# Patient Record
Sex: FEMALE | Race: WHITE | Employment: UNEMPLOYED | URBAN - METROPOLITAN AREA
[De-identification: names, ages, dates, MRNs, and addresses within clinical notes are randomized per-mention and may not be internally consistent; named-entity substitution may affect disease eponyms.]

---

## 2019-01-17 ENCOUNTER — ROUTINE PRENATAL (OUTPATIENT)
Dept: OBGYN CLINIC | Age: 24
End: 2019-01-17

## 2019-01-17 VITALS
WEIGHT: 152 LBS | DIASTOLIC BLOOD PRESSURE: 81 MMHG | BODY MASS INDEX: 25.33 KG/M2 | HEART RATE: 101 BPM | HEIGHT: 65 IN | RESPIRATION RATE: 18 BRPM | SYSTOLIC BLOOD PRESSURE: 129 MMHG

## 2019-01-17 DIAGNOSIS — Z3A.34 34 WEEKS GESTATION OF PREGNANCY: ICD-10-CM

## 2019-01-17 DIAGNOSIS — Z34.03 ENCOUNTER FOR SUPERVISION OF NORMAL FIRST PREGNANCY IN THIRD TRIMESTER: Primary | ICD-10-CM

## 2019-01-17 PROBLEM — R82.71 GROUP B STREPTOCOCCAL BACTERIURIA: Status: ACTIVE | Noted: 2019-01-17

## 2019-01-17 LAB
ANTIBODY SCREEN, EXTERNAL: NEGATIVE
ANTIBODY SCREEN, EXTERNAL: NEGATIVE
CHLAMYDIA, EXTERNAL: NEGATIVE
HBSAG, EXTERNAL: NEGATIVE
HBSAG, EXTERNAL: NON REACTIVE
HIV, EXTERNAL: NON REACTIVE
N. GONORRHEA, EXTERNAL: NEGATIVE
PAP SMEAR, EXTERNAL: NEGATIVE
RPR, EXTERNAL: NON REACTIVE
RUBELLA, EXTERNAL: NORMAL
TYPE, ABO & RH, EXTERNAL: NORMAL
URINALYSIS, EXTERNAL: NORMAL

## 2019-01-17 NOTE — PROGRESS NOTES
Patient presents [place] prenatal [place]. She has no complaints today. Review of her records shows no pregnancy complications other than a GBS UTI, which has been added to her problem list. She is aware of the need for antibiotics during labor. She is allergic to PCN and no sensitivity testing was done for the urine culture. First and third trimester packets given and all questions answered concerning our practice and L&D protocols. Follow up 2 weeks on rotation. Plan of care discussed. Patient expressed understanding.

## 2019-01-31 ENCOUNTER — ROUTINE PRENATAL (OUTPATIENT)
Dept: OBGYN CLINIC | Age: 24
End: 2019-01-31

## 2019-01-31 ENCOUNTER — HOSPITAL ENCOUNTER (OUTPATIENT)
Dept: LAB | Age: 24
Discharge: HOME | End: 2019-01-31
Payer: OTHER GOVERNMENT

## 2019-01-31 VITALS
TEMPERATURE: 96.7 F | DIASTOLIC BLOOD PRESSURE: 76 MMHG | SYSTOLIC BLOOD PRESSURE: 116 MMHG | HEART RATE: 111 BPM | WEIGHT: 155 LBS | HEIGHT: 65 IN | RESPIRATION RATE: 19 BRPM | BODY MASS INDEX: 25.83 KG/M2

## 2019-01-31 DIAGNOSIS — Z3A.36 36 WEEKS GESTATION OF PREGNANCY: ICD-10-CM

## 2019-01-31 DIAGNOSIS — Z34.03 SUPERVISION OF NORMAL FIRST PREGNANCY IN THIRD TRIMESTER: ICD-10-CM

## 2019-01-31 DIAGNOSIS — Z3A.36 36 WEEKS GESTATION OF PREGNANCY: Primary | ICD-10-CM

## 2019-01-31 LAB
BILIRUB UR QL STRIP: NEGATIVE
GLUCOSE UR-MCNC: NEGATIVE MG/DL
KETONES P FAST UR STRIP-MCNC: NEGATIVE MG/DL
PH UR STRIP: 6 [PH] (ref 4.6–8)
PROT UR QL STRIP: NEGATIVE
SP GR UR STRIP: 1.03 (ref 1–1.03)
UA UROBILINOGEN AMB POC: NORMAL (ref 0.2–1)
URINALYSIS CLARITY POC: CLEAR
URINALYSIS COLOR POC: YELLOW
URINE BLOOD POC: NEGATIVE
URINE LEUKOCYTES POC: NEGATIVE
URINE NITRITES POC: NEGATIVE

## 2019-01-31 PROCEDURE — 87491 CHLMYD TRACH DNA AMP PROBE: CPT

## 2019-01-31 NOTE — PROGRESS NOTES
Ms. Javad Kay is a   36w5d with Estimated Date of Delivery: 19 presents to the office for a routine  prenatal visit. She denies contractions, leakage of fluid, or vaginal bleeding. She reports normal fetal movement. Visit Vitals  /76   Pulse (!) 111   Temp 96.7 °F (35.9 °C) (Oral)   Resp 19   Ht 5' 5\" (1.651 m)   Wt 155 lb (70.3 kg)   LMP 2018   BMI 25.79 kg/m²     SVE: /-3    A/P  36w5d IUP, normal prenatal visit. 1. Continue routine prenatal care  2. Strict 3rd trimester precautions given. Advised regarding leakage of fluid, contractions, and vaginal bleeding. Fetal kick count instructions given. 3. F/U in 1 weeks  4. Cultures today; GBS bacteriuria so no swab collected today.

## 2019-02-01 LAB
C TRACH RRNA SPEC QL NAA+PROBE: NEGATIVE
N GONORRHOEA RRNA SPEC QL NAA+PROBE: NEGATIVE
SPECIMEN SOURCE: NORMAL
T VAGINALIS RRNA SPEC QL NAA+PROBE: NEGATIVE

## 2019-02-08 ENCOUNTER — ROUTINE PRENATAL (OUTPATIENT)
Dept: OBGYN CLINIC | Age: 24
End: 2019-02-08

## 2019-02-08 VITALS
RESPIRATION RATE: 18 BRPM | HEART RATE: 84 BPM | HEIGHT: 65 IN | DIASTOLIC BLOOD PRESSURE: 82 MMHG | OXYGEN SATURATION: 100 % | BODY MASS INDEX: 25.83 KG/M2 | SYSTOLIC BLOOD PRESSURE: 132 MMHG | WEIGHT: 155 LBS

## 2019-02-08 DIAGNOSIS — Z3A.37 37 WEEKS GESTATION OF PREGNANCY: Primary | ICD-10-CM

## 2019-02-08 NOTE — PATIENT INSTRUCTIONS

## 2019-02-08 NOTE — PROGRESS NOTES
37w5d. No OB issues. No OB issues. Recent NARCISO from [address] for [employer]. Labor precautions reviewed. RTO 1 week.

## 2019-02-15 ENCOUNTER — ROUTINE PRENATAL (OUTPATIENT)
Dept: OBGYN CLINIC | Age: 24
End: 2019-02-15

## 2019-02-15 VITALS
TEMPERATURE: 97.8 F | RESPIRATION RATE: 18 BRPM | SYSTOLIC BLOOD PRESSURE: 125 MMHG | HEART RATE: 102 BPM | WEIGHT: 158 LBS | DIASTOLIC BLOOD PRESSURE: 81 MMHG | BODY MASS INDEX: 26.33 KG/M2 | HEIGHT: 65 IN

## 2019-02-15 DIAGNOSIS — Z34.03 SUPERVISION OF NORMAL FIRST PREGNANCY IN THIRD TRIMESTER: ICD-10-CM

## 2019-02-15 DIAGNOSIS — Z3A.38 38 WEEKS GESTATION OF PREGNANCY: Primary | ICD-10-CM

## 2019-02-15 LAB
BILIRUB UR QL STRIP: NEGATIVE
GLUCOSE UR-MCNC: NEGATIVE MG/DL
KETONES P FAST UR STRIP-MCNC: NEGATIVE MG/DL
PH UR STRIP: 6.5 [PH] (ref 4.6–8)
PROT UR QL STRIP: NEGATIVE
SP GR UR STRIP: 1.01 (ref 1–1.03)
UA UROBILINOGEN AMB POC: NORMAL (ref 0.2–1)
URINALYSIS CLARITY POC: CLEAR
URINALYSIS COLOR POC: YELLOW
URINE BLOOD POC: NEGATIVE
URINE LEUKOCYTES POC: NEGATIVE
URINE NITRITES POC: NEGATIVE

## 2019-02-15 NOTE — PROGRESS NOTES
Ms. Smith Louis is a   38w6d with Estimated Date of Delivery: 19 presents to the office for a routine  prenatal visit. She denies contractions, leakage of fluid, or vaginal bleeding. She reports normal fetal movement. Visit Vitals  /81   Pulse (!) 102   Temp 97.8 °F (36.6 °C)   Resp 18   Ht 5' 5\" (1.651 m)   Wt 158 lb (71.7 kg)   LMP 2018   BMI 26.29 kg/m²     SVE: /-2    A/P  38w6d IUP, normal prenatal visit. 1. Continue routine prenatal care  2. Strict 3rd trimester precautions given. Advised regarding leakage of fluid, contractions, and vaginal bleeding. Fetal kick count instructions given.   3. F/U in 1 weeks

## 2019-02-17 ENCOUNTER — ANESTHESIA (OUTPATIENT)
Dept: LABOR AND DELIVERY | Age: 24
End: 2019-02-17
Payer: OTHER GOVERNMENT

## 2019-02-17 ENCOUNTER — ANESTHESIA EVENT (OUTPATIENT)
Dept: LABOR AND DELIVERY | Age: 24
End: 2019-02-17
Payer: OTHER GOVERNMENT

## 2019-02-17 ENCOUNTER — HOSPITAL ENCOUNTER (INPATIENT)
Age: 24
LOS: 3 days | Discharge: HOME | End: 2019-02-20
Attending: OBSTETRICS & GYNECOLOGY | Admitting: OBSTETRICS & GYNECOLOGY
Payer: OTHER GOVERNMENT

## 2019-02-17 DIAGNOSIS — Z3A.39 39 WEEKS GESTATION OF PREGNANCY: ICD-10-CM

## 2019-02-17 PROBLEM — Z34.90 PREGNANCY: Status: ACTIVE | Noted: 2019-02-17

## 2019-02-17 PROBLEM — O42.90 PROM (PREMATURE RUPTURE OF MEMBRANES): Status: ACTIVE | Noted: 2019-02-17

## 2019-02-17 LAB
A1 MICROGLOB PLACENTAL VAG QL: POSITIVE
ABO + RH BLD: NORMAL
BASOPHILS # BLD: 0 K/UL (ref 0–0.1)
BASOPHILS NFR BLD: 0 % (ref 0–2)
BLOOD GROUP ANTIBODIES SERPL: NORMAL
CONTROL LINE PRESENT?: NORMAL
DIFFERENTIAL METHOD BLD: ABNORMAL
EOSINOPHIL # BLD: 0 K/UL (ref 0–0.4)
EOSINOPHIL NFR BLD: 0 % (ref 0–5)
ERYTHROCYTE [DISTWIDTH] IN BLOOD BY AUTOMATED COUNT: 14.9 % (ref 11.6–14.5)
EXPIRATION DATE: NORMAL
HCT VFR BLD AUTO: 33.5 % (ref 35–45)
HGB BLD-MCNC: 10.6 G/DL (ref 12–16)
INTERNAL NEGATIVE CONTROL: NORMAL
KIT LOT NO.: NORMAL
LYMPHOCYTES # BLD: 1 K/UL (ref 0.9–3.6)
LYMPHOCYTES NFR BLD: 11 % (ref 21–52)
MCH RBC QN AUTO: 25.1 PG (ref 24–34)
MCHC RBC AUTO-ENTMCNC: 31.6 G/DL (ref 31–37)
MCV RBC AUTO: 79.4 FL (ref 74–97)
MONOCYTES # BLD: 0.5 K/UL (ref 0.05–1.2)
MONOCYTES NFR BLD: 5 % (ref 3–10)
NEUTS SEG # BLD: 7.9 K/UL (ref 1.8–8)
NEUTS SEG NFR BLD: 84 % (ref 40–73)
PLATELET # BLD AUTO: 144 K/UL (ref 135–420)
PMV BLD AUTO: 12.7 FL (ref 9.2–11.8)
RBC # BLD AUTO: 4.22 M/UL (ref 4.2–5.3)
SPECIMEN EXP DATE BLD: NORMAL
WBC # BLD AUTO: 9.4 K/UL (ref 4.6–13.2)

## 2019-02-17 PROCEDURE — 86900 BLOOD TYPING SEROLOGIC ABO: CPT

## 2019-02-17 PROCEDURE — 75410000002 HC LABOR FEE PER 1 HR

## 2019-02-17 PROCEDURE — 77030034849

## 2019-02-17 PROCEDURE — 74011000250 HC RX REV CODE- 250: Performed by: OBSTETRICS & GYNECOLOGY

## 2019-02-17 PROCEDURE — 84112 EVAL AMNIOTIC FLUID PROTEIN: CPT | Performed by: OBSTETRICS & GYNECOLOGY

## 2019-02-17 PROCEDURE — 65270000029 HC RM PRIVATE

## 2019-02-17 PROCEDURE — 85025 COMPLETE CBC W/AUTO DIFF WBC: CPT

## 2019-02-17 PROCEDURE — 74011000258 HC RX REV CODE- 258: Performed by: OBSTETRICS & GYNECOLOGY

## 2019-02-17 PROCEDURE — 74011250636 HC RX REV CODE- 250/636: Performed by: OBSTETRICS & GYNECOLOGY

## 2019-02-17 RX ORDER — MAG HYDROX/ALUMINUM HYD/SIMETH 200-200-20
15 SUSPENSION, ORAL (FINAL DOSE FORM) ORAL
Status: DISCONTINUED | OUTPATIENT
Start: 2019-02-17 | End: 2019-02-18

## 2019-02-17 RX ORDER — LIDOCAINE HYDROCHLORIDE 10 MG/ML
30 INJECTION, SOLUTION EPIDURAL; INFILTRATION; INTRACAUDAL; PERINEURAL AS NEEDED
Status: DISCONTINUED | OUTPATIENT
Start: 2019-02-17 | End: 2019-02-18

## 2019-02-17 RX ORDER — HYDROMORPHONE HYDROCHLORIDE 1 MG/ML
1 INJECTION, SOLUTION INTRAMUSCULAR; INTRAVENOUS; SUBCUTANEOUS
Status: DISCONTINUED | OUTPATIENT
Start: 2019-02-17 | End: 2019-02-18

## 2019-02-17 RX ORDER — OXYTOCIN/RINGER'S LACTATE 20/1000 ML
125 PLASTIC BAG, INJECTION (ML) INTRAVENOUS AS NEEDED
Status: DISCONTINUED | OUTPATIENT
Start: 2019-02-17 | End: 2019-02-18

## 2019-02-17 RX ORDER — SODIUM CHLORIDE, SODIUM LACTATE, POTASSIUM CHLORIDE, CALCIUM CHLORIDE 600; 310; 30; 20 MG/100ML; MG/100ML; MG/100ML; MG/100ML
125 INJECTION, SOLUTION INTRAVENOUS CONTINUOUS
Status: DISCONTINUED | OUTPATIENT
Start: 2019-02-17 | End: 2019-02-20 | Stop reason: HOSPADM

## 2019-02-17 RX ORDER — CARBOPROST TROMETHAMINE 250 UG/ML
250 INJECTION, SOLUTION INTRAMUSCULAR
Status: DISCONTINUED | OUTPATIENT
Start: 2019-02-17 | End: 2019-02-18

## 2019-02-17 RX ORDER — ONDANSETRON 2 MG/ML
4 INJECTION INTRAMUSCULAR; INTRAVENOUS
Status: DISCONTINUED | OUTPATIENT
Start: 2019-02-17 | End: 2019-02-18

## 2019-02-17 RX ORDER — OXYTOCIN 10 [USP'U]/ML
10 INJECTION, SOLUTION INTRAMUSCULAR; INTRAVENOUS
Status: DISCONTINUED | OUTPATIENT
Start: 2019-02-17 | End: 2019-02-18

## 2019-02-17 RX ORDER — OXYTOCIN/RINGER'S LACTATE 20/1000 ML
999 PLASTIC BAG, INJECTION (ML) INTRAVENOUS AS NEEDED
Status: DISCONTINUED | OUTPATIENT
Start: 2019-02-17 | End: 2019-02-18

## 2019-02-17 RX ORDER — TERBUTALINE SULFATE 1 MG/ML
0.25 INJECTION SUBCUTANEOUS
Status: DISCONTINUED | OUTPATIENT
Start: 2019-02-17 | End: 2019-02-18

## 2019-02-17 RX ORDER — MISOPROSTOL 200 UG/1
800 TABLET ORAL
Status: DISCONTINUED | OUTPATIENT
Start: 2019-02-17 | End: 2019-02-18

## 2019-02-17 RX ORDER — OXYTOCIN/0.9 % SODIUM CHLORIDE 30/500 ML
0-25 PLASTIC BAG, INJECTION (ML) INTRAVENOUS
Status: DISCONTINUED | OUTPATIENT
Start: 2019-02-17 | End: 2019-02-18

## 2019-02-17 RX ORDER — METHYLERGONOVINE MALEATE 0.2 MG/ML
0.2 INJECTION INTRAVENOUS AS NEEDED
Status: DISCONTINUED | OUTPATIENT
Start: 2019-02-17 | End: 2019-02-18

## 2019-02-17 RX ORDER — NALBUPHINE HYDROCHLORIDE 10 MG/ML
10 INJECTION, SOLUTION INTRAMUSCULAR; INTRAVENOUS; SUBCUTANEOUS
Status: DISCONTINUED | OUTPATIENT
Start: 2019-02-17 | End: 2019-02-18

## 2019-02-17 RX ADMIN — SODIUM CHLORIDE, SODIUM LACTATE, POTASSIUM CHLORIDE, AND CALCIUM CHLORIDE 125 ML/HR: 600; 310; 30; 20 INJECTION, SOLUTION INTRAVENOUS at 17:30

## 2019-02-17 RX ADMIN — OXYTOCIN-SODIUM CHLORIDE 0.9% IV SOLN 30 UNIT/500ML 1 MILLI-UNITS/MIN: 30-0.9/5 SOLUTION at 20:00

## 2019-02-17 RX ADMIN — SODIUM CHLORIDE 900 MG: 900 INJECTION, SOLUTION INTRAVENOUS at 18:00

## 2019-02-17 NOTE — H&P
History & Physical 
 
Name: Helga Titus MRN: [id number]  SSN: [id number] YOB: 1995  Age: 21 y.o. Sex: female Subjective:  
 
Estimated Date of Delivery: 19 OB History  Para Term  AB Living 1 0       0 SAB TAB Ectopic Molar Multiple Live Births Ms. Erica Jamil is admitted with pregnancy at 39w1d for Presumptive ROM . Prenatal course was normal.Prenatal care has been followed by Charito Arizmendi after she had a NARCISO 2 month old for [employer] assignment. Please see prenatal records for details. She states she had spontaneous rupture of membranes at 11 am. She denies any regular contractions. When she presented to L&D, she was confirmed SROM and was /-3 posterior. External records reviewed:  Noted GBS in urine with presumed Penicillin allergy. She was told as a child not to take it. History reviewed. No pertinent past medical history. History reviewed. No pertinent surgical history. Social History Occupational History  Not on file Tobacco Use  Smoking status: Never Smoker  Smokeless tobacco: Never Used Substance and Sexual Activity  Alcohol use: No  
  Frequency: Never  Drug use: No  
 Sexual activity: Yes  
  Partners: Male Birth control/protection: None History reviewed. No pertinent family history. Allergies Allergen Reactions  Pcn [Penicillins] Unproven on Challenge Prior to Admission medications Medication Sig Start Date End Date Taking? Authorizing Provider PNV66-Iron Fumarate-FA-DSS-DHA 26-1.2- mg cap Take  by mouth. Yes Provider, Historical  
Iron BisGl &PS Imr-F-P63-FA-Ca 255-96-89-6 mg-mg-mcg-mg cap Take  by mouth. Provider, Historical  
AMBULATORY BREAST PUMP Use as directed. 19   Aiden Albarran DO Review of Systems: A comprehensive review of systems was negative except for that written in the HPI. Objective:  
 
Vitals: Vitals:  
 02/17/19 1532 02/17/19 1544 02/17/19 1545 02/17/19 1559 BP: 139/80 139/78 133/83 124/75 Pulse: 91 86 89 92 Resp: 16 Temp: 97.8 °F (36.6 °C) Physical Exam: 
Heart: Regular rate and rhythm Lung: clear to auscultation throughout lung fields, no wheezes, no rales, no rhonchi and normal respiratory effort Fundus: soft and non tender Perineum: blood absent, amniotic fluid present Cervical Exam: 2 cm dilated 50% effaced   
-3 station  Per RN 
Presenting Part: cephalic Lower Extremities:  - Edema No 
Membranes:  Spontaneous Rupture of Membranes; Amniotic Fluid: clear fluid Fetal Heart Rate: Reactive Prenatal Labs:  
Lab Results Component Value Date/Time  
 Rubella, External Immune 01/17/2019 HBsAg, External Non reactive 01/17/2019 HBsAg, External negative 01/17/2019 HIV, External non reactive 01/17/2019 RPR, External Non reactive 01/17/2019 Gonorrhea, External negative 01/17/2019 Chlamydia, External negative 01/17/2019 Assessment/Plan:  
 
Plan: Admit for Reassuring fetal status, Labor  Progressing normally, Continue plan for vaginal delivery. Group B Strep was positive, will treat prophylactically with clindamycin. Will augment with pitocin if needed. The plan of care has been discussed with the patient. She has been given the opportunity to ask questions, which seem to have been answered satisfactorily. Signed By:  Monisha Woodson MD   
 February 17, 2019

## 2019-02-17 NOTE — PROGRESS NOTES
wnwf to l&d for c/o SROM for clear fluid at 1100 this am -pt states rare cramping in her back noted- pt up to change clothes - efm and toco applied- dr. Rylie Kahn aware of pt-oriented to room etc-

## 2019-02-17 NOTE — PROGRESS NOTES
1648- Admission assessment completed at this time. SVE and Amnisure done per Dr. Karin Chairez telephone order. Call light in reach. No other needs. 1700- Spoke with Dr. Karin Chairez. Telephone order with readback orders for admission and start GBS Clindamycin 900mg Q8hrs. Aware amnisure is positive. Unable to find paper work on patient being GBS positive. Patient states she is. Dr. Karin Chairez is aware. 65- Patient states she feels contractions getting stronger, but is still coping. She desires no epidural for her labor. Given ice water. 1840- No needs. call light in reach. Dr. Karin Chairez at bedside to discuss plan of care. 1915- Bedside and Verbal shift change report given to Kandice arnold (oncoming nurse) by Usama arnold (offgoing nurse). Report included the following information SBAR, Kardex, Procedure Summary, Intake/Output, MAR and Recent Results.

## 2019-02-18 PROCEDURE — 74011250636 HC RX REV CODE- 250/636: Performed by: NURSE ANESTHETIST, CERTIFIED REGISTERED

## 2019-02-18 PROCEDURE — 65270000029 HC RM PRIVATE

## 2019-02-18 PROCEDURE — 76060000078 HC EPIDURAL ANESTHESIA

## 2019-02-18 PROCEDURE — 0UQMXZZ REPAIR VULVA, EXTERNAL APPROACH: ICD-10-PCS | Performed by: OBSTETRICS & GYNECOLOGY

## 2019-02-18 PROCEDURE — 75410000002 HC LABOR FEE PER 1 HR

## 2019-02-18 PROCEDURE — 74011000258 HC RX REV CODE- 258: Performed by: OBSTETRICS & GYNECOLOGY

## 2019-02-18 PROCEDURE — 74011250636 HC RX REV CODE- 250/636

## 2019-02-18 PROCEDURE — 75410000003 HC RECOV DEL/VAG/CSECN EA 0.5 HR

## 2019-02-18 PROCEDURE — 00HU33Z INSERTION OF INFUSION DEVICE INTO SPINAL CANAL, PERCUTANEOUS APPROACH: ICD-10-PCS | Performed by: NURSE ANESTHETIST, CERTIFIED REGISTERED

## 2019-02-18 PROCEDURE — 74011000250 HC RX REV CODE- 250: Performed by: NURSE ANESTHETIST, CERTIFIED REGISTERED

## 2019-02-18 PROCEDURE — 74011250636 HC RX REV CODE- 250/636: Performed by: OBSTETRICS & GYNECOLOGY

## 2019-02-18 PROCEDURE — 77030007879 HC KT SPN EPDRL TELE -B: Performed by: NURSE ANESTHETIST, CERTIFIED REGISTERED

## 2019-02-18 PROCEDURE — 0KQM0ZZ REPAIR PERINEUM MUSCLE, OPEN APPROACH: ICD-10-PCS | Performed by: OBSTETRICS & GYNECOLOGY

## 2019-02-18 PROCEDURE — 74011250637 HC RX REV CODE- 250/637: Performed by: OBSTETRICS & GYNECOLOGY

## 2019-02-18 PROCEDURE — 74011250637 HC RX REV CODE- 250/637

## 2019-02-18 PROCEDURE — 74011000250 HC RX REV CODE- 250: Performed by: OBSTETRICS & GYNECOLOGY

## 2019-02-18 PROCEDURE — 77010026065 HC OXYGEN MINIMUM MEDICAL AIR

## 2019-02-18 PROCEDURE — 75410000000 HC DELIVERY VAGINAL/SINGLE

## 2019-02-18 RX ORDER — PHENYLEPHRINE HCL IN 0.9% NACL 0.4MG/10ML
80 SYRINGE (ML) INTRAVENOUS AS NEEDED
Status: DISCONTINUED | OUTPATIENT
Start: 2019-02-18 | End: 2019-02-18

## 2019-02-18 RX ORDER — EPHEDRINE SULFATE 50 MG/ML
10 INJECTION, SOLUTION INTRAVENOUS AS NEEDED
Status: DISCONTINUED | OUTPATIENT
Start: 2019-02-18 | End: 2019-02-18

## 2019-02-18 RX ORDER — IBUPROFEN 400 MG/1
800 TABLET ORAL
Status: DISCONTINUED | OUTPATIENT
Start: 2019-02-18 | End: 2019-02-20 | Stop reason: HOSPADM

## 2019-02-18 RX ORDER — MINERAL OIL
OIL (ML) ORAL
Status: COMPLETED
Start: 2019-02-18 | End: 2019-02-18

## 2019-02-18 RX ORDER — ZOLPIDEM TARTRATE 5 MG/1
5 TABLET ORAL
Status: DISCONTINUED | OUTPATIENT
Start: 2019-02-18 | End: 2019-02-20 | Stop reason: HOSPADM

## 2019-02-18 RX ORDER — PROMETHAZINE HYDROCHLORIDE 25 MG/ML
25 INJECTION, SOLUTION INTRAMUSCULAR; INTRAVENOUS
Status: DISCONTINUED | OUTPATIENT
Start: 2019-02-18 | End: 2019-02-20 | Stop reason: HOSPADM

## 2019-02-18 RX ORDER — FENTANYL CITRATE 50 UG/ML
100 INJECTION, SOLUTION INTRAMUSCULAR; INTRAVENOUS ONCE
Status: COMPLETED | OUTPATIENT
Start: 2019-02-18 | End: 2019-02-18

## 2019-02-18 RX ORDER — ACETAMINOPHEN 325 MG/1
650 TABLET ORAL
Status: DISCONTINUED | OUTPATIENT
Start: 2019-02-18 | End: 2019-02-20 | Stop reason: HOSPADM

## 2019-02-18 RX ORDER — ROPIVACAINE HYDROCHLORIDE 2 MG/ML
INJECTION, SOLUTION EPIDURAL; INFILTRATION; PERINEURAL AS NEEDED
Status: DISCONTINUED | OUTPATIENT
Start: 2019-02-18 | End: 2019-02-18 | Stop reason: HOSPADM

## 2019-02-18 RX ORDER — AMOXICILLIN 250 MG
1 CAPSULE ORAL
Status: DISCONTINUED | OUTPATIENT
Start: 2019-02-18 | End: 2019-02-20 | Stop reason: HOSPADM

## 2019-02-18 RX ORDER — OXYCODONE AND ACETAMINOPHEN 5; 325 MG/1; MG/1
2 TABLET ORAL
Status: DISCONTINUED | OUTPATIENT
Start: 2019-02-18 | End: 2019-02-20 | Stop reason: HOSPADM

## 2019-02-18 RX ADMIN — Medication 2500 MILLI-UNITS/HR: at 04:23

## 2019-02-18 RX ADMIN — MINERAL OIL 60 ML: 99.9 LIQUID ORAL; TOPICAL at 04:10

## 2019-02-18 RX ADMIN — SODIUM CHLORIDE 900 MG: 900 INJECTION, SOLUTION INTRAVENOUS at 02:00

## 2019-02-18 RX ADMIN — Medication 10 ML/HR: at 00:29

## 2019-02-18 RX ADMIN — ONDANSETRON 4 MG: 2 INJECTION INTRAMUSCULAR; INTRAVENOUS at 00:54

## 2019-02-18 RX ADMIN — ROPIVACAINE HYDROCHLORIDE 5 ML: 2 INJECTION, SOLUTION EPIDURAL; INFILTRATION; PERINEURAL at 00:32

## 2019-02-18 RX ADMIN — IBUPROFEN 800 MG: 400 TABLET ORAL at 10:16

## 2019-02-18 RX ADMIN — SODIUM CHLORIDE, SODIUM LACTATE, POTASSIUM CHLORIDE, AND CALCIUM CHLORIDE 125 ML/HR: 600; 310; 30; 20 INJECTION, SOLUTION INTRAVENOUS at 00:40

## 2019-02-18 RX ADMIN — FENTANYL CITRATE 100 MCG: 50 INJECTION, SOLUTION INTRAMUSCULAR; INTRAVENOUS at 00:28

## 2019-02-18 RX ADMIN — Medication 1 SPRAY: at 10:18

## 2019-02-18 RX ADMIN — IBUPROFEN 800 MG: 400 TABLET ORAL at 18:19

## 2019-02-18 RX ADMIN — ROPIVACAINE HYDROCHLORIDE 5 ML: 2 INJECTION, SOLUTION EPIDURAL; INFILTRATION; PERINEURAL at 00:30

## 2019-02-18 NOTE — LACTATION NOTE
Mother states baby nursed briefly after delivery 11 hours ago. Mother was sleepy so discussed typical  feeding patterns/expectations in the first 24 hours. Encouraged skin to skin, look for feeding cues and continue to try. Encouraged to ask for assistance when baby is ready to nurse.

## 2019-02-18 NOTE — PROGRESS NOTES
Labor Progress Note Patient seen, fetal heart rate and contraction pattern evaluated, patient examined. She is comfortable with epidural.  She was recently examined and found to be completely dilated per Nursing. No data found. Physical Exam: 
Cervical Exam:  10 cm dilated 100% effaced 0 station Presenting Part: cephalic Membranes:  Spontaneous Rupture of Membranes; Amniotic Fluid: clear fluid Uterine Activity: Frequency: Every 2-3 minutes Fetal Heart Rate: Reactive Assessment/Plan: 
Reassuring fetal status, Labor  Progressing normally, Continue plan for vaginal delivery Anticipate . Will start pushing.

## 2019-02-18 NOTE — ROUTINE PROCESS
TRANSFER - IN REPORT: 
 
Verbal report received from [address] [place] [address], RN(name) on Syl Posey  being received from L&D recovery(unit) for routine progression of care Report consisted of patients Situation, Background, Assessment and  
Recommendations(SBAR). Information from the following report(s) SBAR, Kardex, Intake/Output, MAR and Recent Results was reviewed with the receiving nurse. Opportunity for questions and clarification was provided. Assessment completed upon patients arrival to unit and care assumed. 0840--vital signs stable--denies need for pain medication--right leg remains heavy from epidural--advised to call for assistance when ready to get up to the bathroom again--verbalizes understanding--offered assistance eith breast feeding prn--po fluids encouraged. 1000--assisted out of bed to the bathroom, and voided a large amount x 1--tolerated well without weakness--both legs able to bear weight--per care reviewed--cold pack replaced to perineum--back to bed--no excessive bleeding--may be up on her own now. 1830--vital signs stable--voiding qs--effective pain management with Motrin--breast feeding is a work in progress.

## 2019-02-18 NOTE — PROGRESS NOTES
conducted a Follow-up Visit and Spiritual Assessment  for Anastacia Perdomo, who is a 23 y.o.,female. Patient's  Peter Pelaez and baby boy Brain Degroot were also in the room. They confirmed their Voodoo affiliation as [address]  is in the [employer] Prayer offered for patient, , and  baby. Baby also received a blessing. Patient's chart reviewed. Chaplains will continue to follow and provide pastoral care as needed or requested. The Rev. [address] Spiritual Care Services [address] [facility] [facility] [phone] / [address] [phone]

## 2019-02-18 NOTE — PROGRESS NOTES
Bedside shift change report given to Stacy Owens RN 
 (oncoming nurse) by Joshua Whittington (offgoing nurse). Report included the following information SBAR  
0725  Asumed care of  Pt who is del since 46  With lac repair  icepack on pt denies pain    holding infant at bs pt wants to eat breakfast prior to motrin . 0745  Assisted to bathroom   Pt voided large amount   pericare inst  
0800  Transferred to [facility] via w/c with infant  calllight to pt  Pt inst niot to get out of bed without nurse  
0805  Report to waleska marley rn

## 2019-02-18 NOTE — ANESTHESIA PROCEDURE NOTES
Epidural Block Start time: 2/18/2019 12:13 AM 
End time: 2/18/2019 12:32 AM 
Performed by: Annmarie Diaz CRNA Authorized by: Annmarie Diaz CRNA Pre-Procedure Indication: labor epidural   
Preanesthetic Checklist: patient identified, risks and benefits discussed, anesthesia consent, patient being monitored, timeout performed and anesthesia consent Timeout Time: 00:13 Epidural:  
Patient position:  Seated Prep region:  Lumbar Prep: Betadine and Patient draped Prep comment:  X3 
Location:  L3-4 Needle and Epidural Catheter:  
Needle Type:  Tuohy Needle Gauge:  18 G Injection Technique:  Loss of resistance using saline Attempts:  1 Catheter Size:  20 G Catheter at Skin Depth (cm):  11 Depth in Epidural Space (cm):  6 Events: no blood with aspiration, no cerebrospinal fluid with aspiration, no paresthesia and negative aspiration test   
Test Dose:  Negative Assessment:  
Catheter Secured:  Tegaderm and tape Insertion:  Uncomplicated Patient tolerance:  Patient tolerated the procedure well with no immediate complications Fentanyl 50 + 50 mcg via CIELO at 1229

## 2019-02-18 NOTE — ANESTHESIA POSTPROCEDURE EVALUATION
* No procedures listed *. Anesthesia Post Evaluation Multimodal analgesia: multimodal analgesia used between 6 hours prior to anesthesia start to PACU discharge Patient location during evaluation: floor Patient participation: complete - patient participated Level of consciousness: awake and alert Pain management: adequate Airway patency: patent Anesthetic complications: no 
Cardiovascular status: acceptable Respiratory status: acceptable Hydration status: acceptable Post anesthesia nausea and vomiting:  none Visit Vitals /67 (BP Patient Position: During activity) Pulse 96 Temp 37.4 °C (99.4 °F) Resp 18 Breastfeeding? Unknown

## 2019-02-18 NOTE — ANESTHESIA PREPROCEDURE EVALUATION
Anesthetic History No history of anesthetic complications Review of Systems / Medical History Patient summary reviewed, nursing notes reviewed and pertinent labs reviewed Pulmonary Within defined limits Neuro/Psych Within defined limits Cardiovascular Within defined limits GI/Hepatic/Renal 
Within defined limits Endo/Other Within defined limits Other Findings Physical Exam 
 
Airway Mallampati: II 
TM Distance: 4 - 6 cm Neck ROM: normal range of motion Mouth opening: Normal 
 
 Cardiovascular Regular rate and rhythm,  S1 and S2 normal,  no murmur, click, rub, or gallop Dental 
No notable dental hx Pulmonary Breath sounds clear to auscultation Abdominal 
GI exam deferred Other Findings Anesthetic Plan ASA: 2 Anesthesia type: epidural 
 
 
 
 
 
Anesthetic plan and risks discussed with: Patient FOB

## 2019-02-19 LAB
HCT VFR BLD AUTO: 28.3 % (ref 35–45)
HGB BLD-MCNC: 8.8 G/DL (ref 12–16)

## 2019-02-19 PROCEDURE — 65270000029 HC RM PRIVATE

## 2019-02-19 PROCEDURE — 74011250637 HC RX REV CODE- 250/637: Performed by: OBSTETRICS & GYNECOLOGY

## 2019-02-19 PROCEDURE — 74011250637 HC RX REV CODE- 250/637: Performed by: SPECIALIST

## 2019-02-19 PROCEDURE — 85018 HEMOGLOBIN: CPT

## 2019-02-19 PROCEDURE — 36415 COLL VENOUS BLD VENIPUNCTURE: CPT

## 2019-02-19 PROCEDURE — 85014 HEMATOCRIT: CPT

## 2019-02-19 RX ORDER — LANOLIN ALCOHOL/MO/W.PET/CERES
1 CREAM (GRAM) TOPICAL 2 TIMES DAILY WITH MEALS
Status: DISCONTINUED | OUTPATIENT
Start: 2019-02-19 | End: 2019-02-20 | Stop reason: HOSPADM

## 2019-02-19 RX ADMIN — SENNOSIDES AND DOCUSATE SODIUM 1 TABLET: 8.6; 5 TABLET ORAL at 16:29

## 2019-02-19 RX ADMIN — FERROUS SULFATE TAB 325 MG (65 MG ELEMENTAL FE) 325 MG: 325 (65 FE) TAB at 17:46

## 2019-02-19 NOTE — ROUTINE PROCESS
Verbal shift change report given to Jj King RN (oncoming nurse) by Danielle Hardwick RN (offgoing nurse). Report included the following information Kardex, Intake/Output, MAR and Recent Results. 1025--voiding without difficulty--no weakness when up--showered last night--Dermaplast Spray and Tucks are easing the discomfort of her perineal repair--offered Sitz Bath--patient denies need--denies pain--breast feeding is a work in progress--requested patient to call to observe latch--verbalizes understanding--po fluids encouraged. 1100--baby sucks fairly well on right breast, but dose not latch well on the left 1615--baby has been sleepy post circumcision, but more alert now--not latching well on left breast--breast shield give to assist--patient to sign consent--denies pain 1830--vital signs stable--voiding qs--denies pain--nipple shield given to aid breast feeding.

## 2019-02-19 NOTE — PROGRESS NOTES
1917: This RN to room for bedside SBAR. Pt resting comfortably in bed, family at side. Denies needs or concerns. Discussed POC for this shift, pt verbalizes understanding. 2000: This RN to room, assessment and VS WNL. Pt denies needs or concerns at this time. 2100: Infant returned to room, assisted to breast. Pt denies needs or concerns. 2310: This RN to room, pt requesting more ice packs. Provided by this RN. Denies other needs or concerns at this time. 0007: This RN to room, pt sleeping comfortably. 0225: Pt resting comfortably, denies needs or concerns at this time. 0405: This RN to room, pt resting comfortably in bed with infant in arms. Denies needs or concerns. 7758: This RN to room, pt ambulating in room. Denies needs or concerns at this time. 12: SBAR to Ana Agudelo RN at this time. Care relinquished.

## 2019-02-19 NOTE — PROGRESS NOTES
Progress Note Patient: Peter Rock MRN: [id number] YOB: 1995  Age: 21 y.o. Sex: female Subjective:  
 
Post Del Day: 1 Delivery: spontaneous vaginal 
 
The patient is resting comfortably and feels well. Pain is  well controlled with current medications. Patient is ambulating and voiding without difficulty. The patient is tolerating a regular diet without nausea or vomiting. Objective:  
  
Patient Vitals for the past 12 hrs: 
 BP Temp Pulse Resp SpO2  
02/19/19 0405 109/61 98.7 °F (37.1 °C) 84 16 98 % General:    alert, cooperative, no distress Lochia:  appropriate Psych:  appropriate to circumstances DVT Evaluation:  No evidence of DVT seen on physical exam.  
 
Lab/Data Review: All lab results for the last 24 hours reviewed. Lab Results Component Value Date/Time WBC 9.4 02/17/2019 04:31 PM  
 HGB 8.8 (L) 02/19/2019 06:49 AM  
 HCT 28.3 (L) 02/19/2019 06:49 AM  
 PLATELET 402 05/65/8798 04:31 PM  
 MCV 79.4 02/17/2019 04:31 PM  
 
 
Assessment:  
 
21 y.o. y/o female status post: spontaneous vaginal 
 
Plan: 1. Ambulation encouraged 2. Pain well-controlled 3. Continue routine postpartum care 4. Iron for anemia Signed By: Quinton Benoit MD   
 February 19, 2019

## 2019-02-19 NOTE — ROUTINE PROCESS
Bedside and Verbal shift change report given to Monica Jean RN (oncoming nurse) by Ciara Meraz RN (offgoing nurse). Report included the following information SBAR, Procedure Summary, Intake/Output, MAR and Recent Results.

## 2019-02-19 NOTE — L&D DELIVERY NOTE
Delivery Summary    Patient: Cate Harrell MRN: [id number]  SSN: [id number]    YOB: 1995  Age: 21 y.o. Sex: female       Information for the patient's :  Gilles Mora [[id number]]       Labor Events:    Labor: No   Rupture Date: 2019   Rupture Time: 11:00 AM   Rupture Type SROM   Amniotic Fluid Volume: Moderate    Amniotic Fluid Description: Clear None   Induction: None       Augmentation: Oxytocin   Labor Events: None     Cervical Ripening:     None     Delivery Events:  Episiotomy:     Laceration(s): Second degree perineal Right labial   Repaired: Yes    Number of Repair Packets: 3   Suture Type and Size: Vicryl 3-0  Rapide 4-0     Estimated Blood Loss (ml): 500ml       Delivery Date: 2019    Delivery Time: 4:22 AM  Delivery Type: Vaginal, Spontaneous  Sex:  Male     Gestational Age: 44w2d   Delivery Clinician:  Jenise Ayala  Living Status: Living   Delivery Location: L&D            APGARS  One minute Five minutes Ten minutes   Skin color: 0   1        Heart rate: 2   2        Grimace: 2   2        Muscle tone: 1   2        Breathin   2        Totals: 7   9            Presentation: Vertex    Position: Right Occiput Anterior  Resuscitation Method:  Tactile Stimulation;Suctioning-bulb     Meconium Stained: Other (Comment) Terminal meconium    Cord Information: 3 Vessels  Complications:    Cord around:    Delayed cord clamping?  Yes  Cord clamped date/time:2019  4:45 AM  Disposition of Cord Blood: Lab    Blood Gases Sent?: No    Placenta:  Date/Time: 2019  4:28 AM  Removal: Spontaneous      Appearance: Normal      Measurements:  Birth Weight: 7 lb 14.5 oz (3.585 kg)      Birth Length: 1' 8\" (0.508 m)      Head Circumference: 1' 1.78\" (0.35 m)      Chest Circumference: 1' 1.78\" (0.35 m)     Abdominal Girth: 1' 1.78\" (0.35 m)    Other Providers:   Yady Rashid [facility] A;Daniela Hutchison Obstetrician;Primary Nurse;Primary [facility] B Strep: No results found for: GRBSEXT, GRBSEXT  Information for the patient's :  [employer] Finder [[id number]]   No results found for: ABORH, PCTABR, PCTDIG, BILI, ABORHEXT, ABORH    No results for input(s): PCO2CB, PO2CB, HCO3I, SO2I, IBD, PTEMPI, SPECTI, PHICB, ISITE, IDEV, IALLEN in the last 72 hours.

## 2019-02-20 VITALS
SYSTOLIC BLOOD PRESSURE: 116 MMHG | RESPIRATION RATE: 17 BRPM | DIASTOLIC BLOOD PRESSURE: 76 MMHG | HEART RATE: 66 BPM | OXYGEN SATURATION: 98 % | TEMPERATURE: 98.1 F

## 2019-02-20 PROCEDURE — 74011250637 HC RX REV CODE- 250/637: Performed by: OBSTETRICS & GYNECOLOGY

## 2019-02-20 PROCEDURE — 74011250637 HC RX REV CODE- 250/637: Performed by: SPECIALIST

## 2019-02-20 RX ORDER — LANOLIN ALCOHOL/MO/W.PET/CERES
325 CREAM (GRAM) TOPICAL 2 TIMES DAILY WITH MEALS
Qty: 60 TAB | Refills: 6 | Status: SHIPPED | OUTPATIENT
Start: 2019-02-20 | End: 2020-05-12 | Stop reason: SDUPTHER

## 2019-02-20 RX ORDER — IBUPROFEN 800 MG/1
800 TABLET ORAL
Qty: 30 TAB | Refills: 0 | Status: SHIPPED | OUTPATIENT
Start: 2019-02-20

## 2019-02-20 RX ADMIN — SENNOSIDES AND DOCUSATE SODIUM 1 TABLET: 8.6; 5 TABLET ORAL at 07:49

## 2019-02-20 RX ADMIN — FERROUS SULFATE TAB 325 MG (65 MG ELEMENTAL FE) 325 MG: 325 (65 FE) TAB at 07:49

## 2019-02-20 NOTE — ROUTINE PROCESS
Mother doing well. Up without complaints. Voiding without problems. Pt refused pain medication through out shift, but is aware of its availability when needed. Pt bonding well with baby.

## 2019-02-20 NOTE — ROUTINE PROCESS
Bedside shift change report given to Jessica Mcneill RN (oncoming nurse) by Kishor Ortiz RN (offgoing nurse). Report included the following information SBAR, Kardex, Procedure Summary, Intake/Output, MAR and Recent Results.

## 2019-02-20 NOTE — DISCHARGE SUMMARY
Obstetrical Discharge Summary       [address] [facility]  [address] [address]              Patient ID:  Jose Luis Alcazar  [id number]  89 y.o.  1995    Admit Date: 2019    Discharge Date: 2019     Admitting Physician: Prerna Mcadams MD     Admission Diagnoses: PROM (premature rupture of membranes) [O42.90]; Pregnancy [Z34.90]    Discharge Diagnoses: same as above      Additional Diagnoses: none        Hospital Course: Unremarkable                           Information for the patient's :  [employer] Finder [[id number]]          Immunizations: There is no immunization history for the selected administration types on file for this patient. Group Beta Strep: No results found for: GRBSEXT     Visit Vitals  /76 (BP 1 Location: Right arm, BP Patient Position: At rest)   Pulse 66   Temp 98.1 °F (36.7 °C)   Resp 17   LMP 2018   SpO2 98%   Breastfeeding? Unknown       Vital signs stable, afebrile. Exam:  Patient without distress. Abdomen soft, fundus firm at level of umbilicus, non tender               Perineum with normal lochia noted. Lower extremities are negative for swelling, cords or tenderness. Patient Instructions:   Current Discharge Medication List      START taking these medications    Details   ferrous sulfate 325 mg (65 mg iron) tablet Take 1 Tab by mouth two (2) times daily (with meals). Qty: 60 Tab, Refills: 6      ibuprofen (MOTRIN) 800 mg tablet Take 1 Tab by mouth every eight (8) hours as needed. Qty: 30 Tab, Refills: 0         CONTINUE these medications which have NOT CHANGED    Details   PNV66-Iron Fumarate-FA-DSS-DHA 26-1.2- mg cap Take  by mouth. Iron BisGl &PS Anl-Y-W63-FA-Ca 860-93-36-8 mg-mg-mcg-mg cap Take  by mouth. AMBULATORY BREAST PUMP Use as directed. Qty: 1 Units, Refills: 0    Comments: Double electric breast pump of patient's choice.              See discharge instructions provided by nursing. Follow-up in 6 weeks.     Signed:  Aj Mccarty DO  2/20/2019  9:19 AM

## 2019-02-20 NOTE — PROGRESS NOTES
0700: Bedside and Verbal shift change report given to [address] (oncoming nurse) by Tiny Gupta RN (offgoing nurse). Report included the following information SBAR, Kardex, Intake/Output and Recent Results. 0745: Assessment completed, WNL. 0830: Pediatrician in to see. 0930:  Up to bathroom. 1000: Discharge instructions reviewed with patient. Understanding verbalized of all instructions given. Time given for questions, questions answered. Electronic signature obtained. 1100: Patient brought to front of hospital via wheelchair, discharged in stable condition.

## 2019-02-20 NOTE — LACTATION NOTE
Mother was given a nipple shield and states baby is now nursing well. Reviewed latch, colostrum, feeding frequency, diapers, milk coming in, pumping and nipple care. Discussed shield use, the need to keep close count of diapers and encouraged her to work with baby to eliminate shield. Gave BF information, daily log and resource guide. Offered assistance if needed.

## 2019-04-01 ENCOUNTER — ROUTINE PRENATAL (OUTPATIENT)
Dept: OBGYN CLINIC | Age: 24
End: 2019-04-01

## 2019-04-01 VITALS
BODY MASS INDEX: 22.99 KG/M2 | HEIGHT: 65 IN | HEART RATE: 77 BPM | WEIGHT: 138 LBS | DIASTOLIC BLOOD PRESSURE: 67 MMHG | SYSTOLIC BLOOD PRESSURE: 121 MMHG

## 2019-04-01 NOTE — PROGRESS NOTES
Yann Sin presents today for   Chief Complaint   Patient presents with   [address]       Is someone accompanying this pt? Infant   atient does not have any concerns at this time. Patient does not want  birth control at this time . Depression screening     Depression Scale  In the past 7 days:  I have been able to laugh and see the funny side of things[de-identified] As much as I always could  I have looked forward with enjoyment to things: As much as I ever did  I have blamed myself unnecessarily when things went wrong: No, never  I have been anxious or worried for no good reason: No, not at all  I have felt scared or panicky for no good reason: No, not at all  Things have been getting on top of me: No, I have been coping as well as ever  I have been so unhappy that I have had difficulty sleeping: No, not at all  I have felt sad or miserable: No, not at all  I have been so unhappy that I have been crying: No, never     Is the patient using any DME equipment during OV? Breast pump     Depression Screening:  No flowsheet data found. Learning Assessment:  No flowsheet data found. Abuse Screening:  No flowsheet data found. Fall Risk  No flowsheet data found. Health Maintenance reviewed and discussed and ordered per Provider. Health Maintenance Due   Topic Date Due    HPV Age 9Y-34Y (1 - Female 3-dose series) 2010    PAP AKA CERVICAL CYTOLOGY  2016   .

## 2019-04-01 NOTE — PROGRESS NOTES
Subjective:   Ms. Arpita Erazo is a 21 y.o. who is now 6 weeks postpartum. OB History        1    Para   1    Term   1            AB        Living   1       SAB        TAB        Ectopic        Molar        Multiple   0    Live Births   1              Method of delivery: normal spontaneous vaginal delivery  She is breast-feeding and is not experiencing problems. Pregnancy complications: none. She is feeling well and that she has no suicidal/homicidal inclination. She currently uses abstinence for contraception. She plans to use natural family planning (NFP) for contraception. Patient Active Problem List   Diagnosis Code    Group B streptococcal bacteriuria R82.71    PROM (premature rupture of membranes) O42.90    Pregnancy Z34.90     Current Outpatient Medications   Medication Sig Dispense Refill    ferrous sulfate 325 mg (65 mg iron) tablet Take 1 Tab by mouth two (2) times daily (with meals). 60 Tab 6    AMBULATORY BREAST PUMP Use as directed. 1 Units 0    ibuprofen (MOTRIN) 800 mg tablet Take 1 Tab by mouth every eight (8) hours as needed. 30 Tab 0    PNV66-Iron Fumarate-FA-DSS-DHA 26-1.2- mg cap Take  by mouth.  Iron BisGl &PS Utc-G-S88-FA-Ca 157-50-75-0 mg-mg-mcg-mg cap Take  by mouth. Allergies   Allergen Reactions    Pcn [Penicillins] Unproven on Challenge     No past medical history on file. No past surgical history on file. No family history on file.   Social History     Tobacco Use    Smoking status: Never Smoker    Smokeless tobacco: Never Used   Substance Use Topics    Alcohol use: No     Frequency: Never        Objective:   Physical Exam:  Date of last Pap smear:   Physical Exam: GENERAL APPEARANCE: alert, well appearing, in no apparent distress  EXTERNAL GENITALIA POSTPARTUM: normal, well-healed, without lesions or masses  VAGINA POSTPARTUM: normal, well-healed, physiologic discharge, without lesions  CERVIX POSTPARTUM: normal, well-healed, without lesions  UTERUS POSTPARTUM: normal size, well involuted, firm, non-tender  ADNEXA POSTPARTUM: no masses palpable and nontender    Assessment/Plan:   normal postpartum exam  patient is a candidate for natural family planning (NFP) for contraception, with no contraindications  See orders and Patient Instructions

## 2019-12-17 ENCOUNTER — ROUTINE PRENATAL (OUTPATIENT)
Dept: OBGYN CLINIC | Age: 24
End: 2019-12-17

## 2019-12-17 VITALS
WEIGHT: 147 LBS | RESPIRATION RATE: 19 BRPM | BODY MASS INDEX: 24.49 KG/M2 | SYSTOLIC BLOOD PRESSURE: 127 MMHG | HEIGHT: 65 IN | HEART RATE: 112 BPM | DIASTOLIC BLOOD PRESSURE: 81 MMHG | TEMPERATURE: 97.7 F

## 2019-12-17 DIAGNOSIS — N92.6 MISSED MENSES: Primary | ICD-10-CM

## 2019-12-17 LAB
HCG URINE, QL. (POC): NEGATIVE
VALID INTERNAL CONTROL?: YES

## 2019-12-17 NOTE — PROGRESS NOTES
Missed menses. Pt with recent delivery in April. Plans to have many children. Dating ultrasound done- 9w1d, which is c/w LMP minus 2 days  PNLs next visit. RTO 2 weeks.

## 2020-01-07 ENCOUNTER — HOSPITAL ENCOUNTER (OUTPATIENT)
Dept: LAB | Age: 25
Discharge: HOME | End: 2020-01-07
Payer: OTHER GOVERNMENT

## 2020-01-07 ENCOUNTER — ROUTINE PRENATAL (OUTPATIENT)
Dept: OBGYN CLINIC | Age: 25
End: 2020-01-07

## 2020-01-07 VITALS
TEMPERATURE: 98 F | WEIGHT: 145 LBS | HEART RATE: 97 BPM | RESPIRATION RATE: 18 BRPM | DIASTOLIC BLOOD PRESSURE: 76 MMHG | BODY MASS INDEX: 24.16 KG/M2 | HEIGHT: 65 IN | SYSTOLIC BLOOD PRESSURE: 113 MMHG

## 2020-01-07 DIAGNOSIS — Z3A.12 PREGNANCY WITH 12 COMPLETED WEEKS GESTATION: Primary | ICD-10-CM

## 2020-01-07 DIAGNOSIS — Z34.81 ENCOUNTER FOR SUPERVISION OF OTHER NORMAL PREGNANCY IN FIRST TRIMESTER: ICD-10-CM

## 2020-01-07 DIAGNOSIS — Z3A.12 12 WEEKS GESTATION OF PREGNANCY: ICD-10-CM

## 2020-01-07 DIAGNOSIS — Z3A.12 PREGNANCY WITH 12 COMPLETED WEEKS GESTATION: ICD-10-CM

## 2020-01-07 LAB
ABO + RH BLD: NORMAL
BASOPHILS # BLD: 0 K/UL (ref 0–0.1)
BASOPHILS NFR BLD: 0 % (ref 0–2)
BLOOD GROUP ANTIBODIES SERPL: NORMAL
DIFFERENTIAL METHOD BLD: ABNORMAL
EOSINOPHIL # BLD: 0 K/UL (ref 0–0.4)
EOSINOPHIL NFR BLD: 1 % (ref 0–5)
ERYTHROCYTE [DISTWIDTH] IN BLOOD BY AUTOMATED COUNT: 13.2 % (ref 11.6–14.5)
HCT VFR BLD AUTO: 35.9 % (ref 35–45)
HGB BLD-MCNC: 11.9 G/DL (ref 12–16)
LYMPHOCYTES # BLD: 1.3 K/UL (ref 0.9–3.6)
LYMPHOCYTES NFR BLD: 19 % (ref 21–52)
MCH RBC QN AUTO: 27.7 PG (ref 24–34)
MCHC RBC AUTO-ENTMCNC: 33.1 G/DL (ref 31–37)
MCV RBC AUTO: 83.7 FL (ref 74–97)
MONOCYTES # BLD: 0.4 K/UL (ref 0.05–1.2)
MONOCYTES NFR BLD: 5 % (ref 3–10)
NEUTS SEG # BLD: 5.3 K/UL (ref 1.8–8)
NEUTS SEG NFR BLD: 75 % (ref 40–73)
PLATELET # BLD AUTO: 212 K/UL (ref 135–420)
PMV BLD AUTO: 11.9 FL (ref 9.2–11.8)
RBC # BLD AUTO: 4.29 M/UL (ref 4.2–5.3)
SPECIMEN EXP DATE BLD: NORMAL
WBC # BLD AUTO: 7 K/UL (ref 4.6–13.2)

## 2020-01-07 PROCEDURE — 86762 RUBELLA ANTIBODY: CPT

## 2020-01-07 PROCEDURE — 86780 TREPONEMA PALLIDUM: CPT

## 2020-01-07 PROCEDURE — 83021 HEMOGLOBIN CHROMOTOGRAPHY: CPT

## 2020-01-07 PROCEDURE — 85025 COMPLETE CBC W/AUTO DIFF WBC: CPT

## 2020-01-07 PROCEDURE — 87340 HEPATITIS B SURFACE AG IA: CPT

## 2020-01-07 PROCEDURE — 87389 HIV-1 AG W/HIV-1&-2 AB AG IA: CPT

## 2020-01-07 PROCEDURE — 87086 URINE CULTURE/COLONY COUNT: CPT

## 2020-01-07 PROCEDURE — 36415 COLL VENOUS BLD VENIPUNCTURE: CPT

## 2020-01-07 PROCEDURE — 87661 TRICHOMONAS VAGINALIS AMPLIF: CPT

## 2020-01-07 PROCEDURE — 86900 BLOOD TYPING SEROLOGIC ABO: CPT

## 2020-01-07 NOTE — PROGRESS NOTES
PRENATAL INTAKE SUMMARY    Ms. Daniel Gomes presents today for her first prenatal visit. OB History        2    Para   1    Term   1            AB        Living   1       SAB        TAB        Ectopic        Molar        Multiple   0    Live Births   1              I have reviewed the patient's medical, obstetrical, social, and family histories, medications, and available lab results. Subjective:   She has no unusual complaints    Objective:   Initial Physical Exam (New OB)    GENERAL APPEARANCE: alert, well appearing, in no apparent distress  HEAD: normocephalic, atraumatic  MOUTH: mucous membranes moist, pharynx normal without lesions  THYROID: no thyromegaly or masses present  BREASTS: not examined  LUNGS: clear to auscultation, no wheezes, rales or rhonchi, symmetric air entry  HEART: regular rate and rhythm, no murmurs  ABDOMEN: soft, nontender, nondistended, no abnormal masses, no epigastric pain and FHT present  EXTREMITIES: no redness or tenderness in the calves or thighs, no edema  SKIN: normal coloration and turgor, no rashes  LYMPH NODES: no adenopathy palpable  NEUROLOGIC: alert, oriented, normal speech, no focal findings or movement disorder noted    PELVIC EXAM: EXTERNAL GENITALIA: normal appearing vulva with no masses, tenderness or lesions  VAGINA: no abnormal discharge or lesions  CERVIX: no lesions or cervical motion tenderness  UTERUS: gravid and consistent with 12 weeks  ADNEXA: no masses palpable and nontender  OB EXAM PELVIMETRY: appears adequate    Assessment/Plan:   Normal pregnancy    Routine Prenatal care    ICD-10-CM ICD-9-CM    1.  Pregnancy with 12 completed weeks gestation Z3A.12 V22.2 CULTURE, URINE      HEMOGLOBIN FRACTIONATION      T PALLIDUM AB      RUBELLA AB, IGG      CBC WITH AUTOMATED DIFF      HEP B SURFACE AG      HIV 1/2 AG/AB, 4TH GENERATION,W RFLX CONFIRM      TYPE & SCREEN   2. 12 weeks gestation of pregnancy Z3A.12 V22.2 CHLAMYDIA/NEISSERIA/TRICHOMONAS AMP Chart reviewed- pap done in 2018 and normal.  Pt declines genetic screening. RTO 4 weeks.

## 2020-01-08 LAB
C TRACH RRNA SPEC QL NAA+PROBE: NEGATIVE
HBV SURFACE AG SER QL: 0.14 INDEX
HBV SURFACE AG SER QL: NEGATIVE
HIV 1+2 AB+HIV1 P24 AG SERPL QL IA: NONREACTIVE
HIV12 RESULT COMMENT, HHIVC: NORMAL
N GONORRHOEA RRNA SPEC QL NAA+PROBE: NEGATIVE
RUBV IGG SER-IMP: NORMAL
SPECIMEN SOURCE: NORMAL
T PALLIDUM AB SER QL IA: NONREACTIVE
T VAGINALIS RRNA SPEC QL NAA+PROBE: NEGATIVE

## 2020-01-09 LAB
BACTERIA SPEC CULT: NORMAL
DEPRECATED HGB OTHER BLD-IMP: 0 %
HGB A MFR BLD: 97.7 % (ref 96.4–98.8)
HGB A2 MFR BLD COLUMN CHROM: 2.3 % (ref 1.8–3.2)
HGB C MFR BLD: 0 %
HGB F MFR BLD: 0 % (ref 0–2)
HGB FRACT BLD-IMP: NORMAL
HGB S BLD QL SOLY: NEGATIVE
HGB S MFR BLD: 0 %
SERVICE CMNT-IMP: NORMAL

## 2020-02-04 ENCOUNTER — ROUTINE PRENATAL (OUTPATIENT)
Dept: OBGYN CLINIC | Age: 25
End: 2020-02-04

## 2020-02-04 VITALS
DIASTOLIC BLOOD PRESSURE: 74 MMHG | RESPIRATION RATE: 18 BRPM | SYSTOLIC BLOOD PRESSURE: 121 MMHG | HEART RATE: 91 BPM | TEMPERATURE: 98 F | BODY MASS INDEX: 24.49 KG/M2 | WEIGHT: 147 LBS | HEIGHT: 65 IN

## 2020-02-04 DIAGNOSIS — Z3A.16 16 WEEKS GESTATION OF PREGNANCY: Primary | ICD-10-CM

## 2020-03-03 ENCOUNTER — ROUTINE PRENATAL (OUTPATIENT)
Dept: OBGYN CLINIC | Age: 25
End: 2020-03-03

## 2020-03-03 VITALS
BODY MASS INDEX: 25.16 KG/M2 | SYSTOLIC BLOOD PRESSURE: 114 MMHG | DIASTOLIC BLOOD PRESSURE: 74 MMHG | RESPIRATION RATE: 18 BRPM | HEIGHT: 65 IN | WEIGHT: 151 LBS | HEART RATE: 81 BPM | TEMPERATURE: 98.3 F

## 2020-03-03 NOTE — PATIENT INSTRUCTIONS
Learning About When to Call Your Doctor During Pregnancy (Up to 20 Weeks)  Your Care Instructions    It's common to have concerns about what might be a problem during pregnancy. Although most pregnant women don't have any serious problems, it's important to know when to call your doctor if you have certain symptoms. These are general suggestions. Your doctor may give you some more information about when to call. When to call your doctor (up to 20 weeks)  Call 911 anytime you think you may need emergency care. For example, call if:  · You passed out (lost consciousness). Call your doctor now or seek immediate medical care if:  · You have a fever. · You have vaginal bleeding. · You are dizzy or lightheaded, or you feel like you may faint. · You have symptoms of a urinary tract infection. These may include:  ? Pain or burning when you urinate. ? A frequent need to urinate without being able to pass much urine. ? Pain in the flank, which is just below the rib cage and above the waist on either side of the back. ? Blood in your urine. · You have belly pain. · You think you are having contractions. · You have a sudden release of fluid from your vagina. Watch closely for changes in your health, and be sure to contact your doctor if:  · You have vaginal discharge that smells bad. · You have other concerns about your pregnancy. Follow-up care is a key part of your treatment and safety. Be sure to make and go to all appointments, and call your doctor if you are having problems. It's also a good idea to know your test results and keep a list of the medicines you take. Where can you learn more? Go to http://jackie-mahesh.info/. Enter M518 in the search box to learn more about \"Learning About When to Call Your Doctor During Pregnancy (Up to 20 Weeks). \"  Current as of: May 29, 2019  Content Version: 12.2  © 0855-7586 [employer].  Care instructions adapted under license by [employer] (which disclaims liability or warranty for this information). If you have questions about a medical condition or this instruction, always ask your healthcare professional. [address] any warranty or liability for your use of this information.

## 2020-03-03 NOTE — PROGRESS NOTES
20w4d. No OB issues. Denies LOF/Vb  Morph done today and normal.  Breast pump form given. RTO 4 weeks.

## 2020-03-31 ENCOUNTER — ROUTINE PRENATAL (OUTPATIENT)
Dept: OBGYN CLINIC | Age: 25
End: 2020-03-31

## 2020-03-31 VITALS
WEIGHT: 153 LBS | TEMPERATURE: 98.3 F | BODY MASS INDEX: 25.49 KG/M2 | DIASTOLIC BLOOD PRESSURE: 78 MMHG | HEART RATE: 88 BPM | HEIGHT: 65 IN | SYSTOLIC BLOOD PRESSURE: 123 MMHG | RESPIRATION RATE: 17 BRPM

## 2020-03-31 DIAGNOSIS — Z3A.24 PREGNANCY WITH 24 COMPLETED WEEKS GESTATION: Primary | ICD-10-CM

## 2020-04-28 ENCOUNTER — OFFICE VISIT (OUTPATIENT)
Dept: OBGYN CLINIC | Age: 25
End: 2020-04-28

## 2020-04-28 ENCOUNTER — HOSPITAL ENCOUNTER (OUTPATIENT)
Dept: LAB | Age: 25
Discharge: HOME | End: 2020-04-28
Payer: OTHER GOVERNMENT

## 2020-04-28 ENCOUNTER — ROUTINE PRENATAL (OUTPATIENT)
Dept: OBGYN CLINIC | Age: 25
End: 2020-04-28

## 2020-04-28 VITALS
WEIGHT: 163 LBS | DIASTOLIC BLOOD PRESSURE: 63 MMHG | BODY MASS INDEX: 27.16 KG/M2 | HEART RATE: 107 BPM | SYSTOLIC BLOOD PRESSURE: 98 MMHG | TEMPERATURE: 97.4 F | HEIGHT: 65 IN

## 2020-04-28 DIAGNOSIS — Z3A.28 PREGNANCY WITH 28 COMPLETED WEEKS GESTATION: Primary | ICD-10-CM

## 2020-04-28 DIAGNOSIS — Z3A.28 28 WEEKS GESTATION OF PREGNANCY: Primary | ICD-10-CM

## 2020-04-28 DIAGNOSIS — Z3A.28 28 WEEKS GESTATION OF PREGNANCY: ICD-10-CM

## 2020-04-28 LAB
BASOPHILS # BLD: 0 K/UL (ref 0–0.1)
BASOPHILS NFR BLD: 0 % (ref 0–2)
DIFFERENTIAL METHOD BLD: ABNORMAL
EOSINOPHIL # BLD: 0 K/UL (ref 0–0.4)
EOSINOPHIL NFR BLD: 1 % (ref 0–5)
ERYTHROCYTE [DISTWIDTH] IN BLOOD BY AUTOMATED COUNT: 13.1 % (ref 11.6–14.5)
GLUCOSE 1H P 100 G GLC PO SERPL-MCNC: 96 MG/DL (ref 60–140)
HCT VFR BLD AUTO: 31.7 % (ref 35–45)
HGB BLD-MCNC: 10.1 G/DL (ref 12–16)
LYMPHOCYTES # BLD: 1 K/UL (ref 0.9–3.6)
LYMPHOCYTES NFR BLD: 13 % (ref 21–52)
MCH RBC QN AUTO: 27.1 PG (ref 24–34)
MCHC RBC AUTO-ENTMCNC: 31.9 G/DL (ref 31–37)
MCV RBC AUTO: 85 FL (ref 74–97)
MONOCYTES # BLD: 0.5 K/UL (ref 0.05–1.2)
MONOCYTES NFR BLD: 6 % (ref 3–10)
NEUTS SEG # BLD: 6 K/UL (ref 1.8–8)
NEUTS SEG NFR BLD: 80 % (ref 40–73)
PLATELET # BLD AUTO: 160 K/UL (ref 135–420)
PMV BLD AUTO: 11.3 FL (ref 9.2–11.8)
RBC # BLD AUTO: 3.73 M/UL (ref 4.2–5.3)
WBC # BLD AUTO: 7.5 K/UL (ref 4.6–13.2)

## 2020-04-28 PROCEDURE — 36415 COLL VENOUS BLD VENIPUNCTURE: CPT

## 2020-04-28 PROCEDURE — 85025 COMPLETE CBC W/AUTO DIFF WBC: CPT

## 2020-04-28 PROCEDURE — 82950 GLUCOSE TEST: CPT

## 2020-04-28 NOTE — PROGRESS NOTES
Chief Complaint   Patient presents with    Routine Prenatal Visit     Visit Vitals  BP 98/63   Pulse (!) 107   Temp 97.4 °F (36.3 °C)   Ht 5' 5\" (1.651 m)   Wt 163 lb (73.9 kg)   LMP 10/11/2019 (Exact Date)   BMI 27.12 kg/m²     Results for orders placed or performed during the hospital encounter of 01/07/20   CULTURE, URINE   Result Value Ref Range    Special Requests: NO SPECIAL REQUESTS      Culture result: NO GROWTH 2 DAYS     HEMOGLOBIN FRACTIONATION   Result Value Ref Range    Hgb Solubility NEGATIVE  NEGATIVE      Hemoglobin A 97.7 96.4 - 98.8 %    Hemoglobin S 0.0 0.0 %    Hemoglobin C 0.0 0.0 %    Hemoglobin A2 2.3 1.8 - 3.2 %    Hemoglobin F 0.0 0.0 - 2.0 %    Hemoglobin, Other 0.0 0.0 %    Interpretation Comment     T PALLIDUM AB   Result Value Ref Range    T. pallidum interpretation. NONREACTIVE NR     RUBELLA AB, IGG   Result Value Ref Range    Rubella IgG, QL IMMUNE IMM     CBC WITH AUTOMATED DIFF   Result Value Ref Range    WBC 7.0 4.6 - 13.2 K/uL    RBC 4.29 4. 20 - 5.30 M/uL    HGB 11.9 (L) 12.0 - 16.0 g/dL    HCT 35.9 35.0 - 45.0 %    MCV 83.7 74.0 - 97.0 FL    MCH 27.7 24.0 - 34.0 PG    MCHC 33.1 31.0 - 37.0 g/dL    RDW 13.2 11.6 - 14.5 %    PLATELET 834 769 - 500 K/uL    MPV 11.9 (H) 9.2 - 11.8 FL    NEUTROPHILS 75 (H) 40 - 73 %    LYMPHOCYTES 19 (L) 21 - 52 %    MONOCYTES 5 3 - 10 %    EOSINOPHILS 1 0 - 5 %    BASOPHILS 0 0 - 2 %    ABS. NEUTROPHILS 5.3 1.8 - 8.0 K/UL    ABS. LYMPHOCYTES 1.3 0.9 - 3.6 K/UL    ABS. MONOCYTES 0.4 0.05 - 1.2 K/UL    ABS. EOSINOPHILS 0.0 0.0 - 0.4 K/UL    ABS. BASOPHILS 0.0 0.0 - 0.1 K/UL    DF AUTOMATED     HEP B SURFACE AG   Result Value Ref Range    Hepatitis B surface Ag 0.14 <1.00 Index    Hep B surface Ag Interp.  NEGATIVE  NEG     HIV 1/2 AG/AB, 4TH GENERATION,W RFLX CONFIRM   Result Value Ref Range    HIV 1/2 Interpretation NONREACTIVE NR      HIV 1/2 result comment SEE NOTE     CHLAMYDIA/NEISSERIA/TRICHOMONAS AMP   Result Value Ref Range    Chlamydia amplification NEGATIVE  NEG      N. gonorrhoeae amplification NEGATIVE  NEG      Trichomonas amplification NEGATIVE  NEG      Specimen Source ENDOCERVICAL     TYPE & SCREEN   Result Value Ref Range    Crossmatch Expiration 01/10/2020     ABO/Rh(D) A POSITIVE     Antibody screen NEG

## 2020-05-01 ENCOUNTER — TELEPHONE (OUTPATIENT)
Dept: OBGYN CLINIC | Age: 25
End: 2020-05-01

## 2020-05-01 NOTE — TELEPHONE ENCOUNTER
Poke with this patient concerning Lab work and Rx that has been sent to her Pharmacy , all questions answered patient voiced understanding

## 2020-05-01 NOTE — TELEPHONE ENCOUNTER
----- Message from Aarti Garcia MD sent at 4/28/2020  2:07 PM EDT -----  Normal diabetes screen.   Worsening anemia- needs Iron sulfate 325 mg daily-OTC  Please inform

## 2020-05-12 ENCOUNTER — VIRTUAL VISIT (OUTPATIENT)
Dept: OBGYN CLINIC | Age: 25
End: 2020-05-12

## 2020-05-12 DIAGNOSIS — Z3A.30 PREGNANCY WITH 30 COMPLETED WEEKS GESTATION: Primary | ICD-10-CM

## 2020-05-12 RX ORDER — LANOLIN ALCOHOL/MO/W.PET/CERES
325 CREAM (GRAM) TOPICAL DAILY
Qty: 60 TAB | Refills: 4 | Status: SHIPPED | OUTPATIENT
Start: 2020-05-12

## 2020-05-12 NOTE — PROGRESS NOTES
30w4d. No OB issues. Pt seen via [facility] me  Notes normal FM. Denies LOF/VB  Glucola normal with worsening anemia. Pt requests Rx sent to pharmacy for Iron. Rx sent. RTO 3 weeks.

## 2020-05-12 NOTE — PROGRESS NOTES
Peter Cote presents today for   Chief Complaint   Patient presents with    Routine Prenatal Visit       Is someone accompanying this pt? no  Patient does not have any concerns at this time. Patient no birth control at this time . Depression screening     Is the patient using any DME equipment during OV? no    Depression Screening:  No flowsheet data found. Learning Assessment:  No flowsheet data found. Abuse Screening:  No flowsheet data found. Fall Risk  No flowsheet data found. This Visit Test  Results for orders placed or performed during the hospital encounter of 04/28/20   CBC WITH AUTOMATED DIFF   Result Value Ref Range    WBC 7.5 4.6 - 13.2 K/uL    RBC 3.73 (L) 4.20 - 5.30 M/uL    HGB 10.1 (L) 12.0 - 16.0 g/dL    HCT 31.7 (L) 35.0 - 45.0 %    MCV 85.0 74.0 - 97.0 FL    MCH 27.1 24.0 - 34.0 PG    MCHC 31.9 31.0 - 37.0 g/dL    RDW 13.1 11.6 - 14.5 %    PLATELET 563 878 - 057 K/uL    MPV 11.3 9.2 - 11.8 FL    NEUTROPHILS 80 (H) 40 - 73 %    LYMPHOCYTES 13 (L) 21 - 52 %    MONOCYTES 6 3 - 10 %    EOSINOPHILS 1 0 - 5 %    BASOPHILS 0 0 - 2 %    ABS. NEUTROPHILS 6.0 1.8 - 8.0 K/UL    ABS. LYMPHOCYTES 1.0 0.9 - 3.6 K/UL    ABS. MONOCYTES 0.5 0.05 - 1.2 K/UL    ABS. EOSINOPHILS 0.0 0.0 - 0.4 K/UL    ABS. BASOPHILS 0.0 0.0 - 0.1 K/UL    DF AUTOMATED     GLUCOSE, GESTATIONAL 1 HR TOLERANCE   Result Value Ref Range    GESTATIONAL GLUC 1HR 96 60 - 140 MG/DL       Health Maintenance reviewed and discussed and ordered per Provider. Health Maintenance Due   Topic Date Due    HPV Age 9Y-34Y (1 - 2-dose series) 07/25/2006    OB 3RD TRIMESTER TDAP  04/17/2020   . Coordination of Care:  1. Have you been to the ER, urgent care clinic since your last visit? Hospitalized since your last visit? no    2. Have you seen or consulted any other health care providers outside of the [address] [place] [facility] [place] [address] since your last visit? Include any pap smears or colon screening.  no

## 2020-06-02 ENCOUNTER — ROUTINE PRENATAL (OUTPATIENT)
Dept: OBGYN CLINIC | Age: 25
End: 2020-06-02

## 2020-06-02 VITALS
DIASTOLIC BLOOD PRESSURE: 71 MMHG | SYSTOLIC BLOOD PRESSURE: 121 MMHG | BODY MASS INDEX: 28.16 KG/M2 | RESPIRATION RATE: 18 BRPM | HEART RATE: 107 BPM | WEIGHT: 169 LBS | TEMPERATURE: 96.9 F | HEIGHT: 65 IN

## 2020-06-02 DIAGNOSIS — Z3A.33 PREGNANCY WITH 33 COMPLETED WEEKS GESTATION: Primary | ICD-10-CM

## 2020-06-02 NOTE — PROGRESS NOTES
Chief Complaint   Patient presents with    Routine Prenatal Visit     Visit Vitals  /71   Pulse (!) 107   Temp 96.9 °F (36.1 °C) (Tympanic)   Resp 18   Ht 5' 5\" (1.651 m)   Wt 169 lb (76.7 kg)   LMP 10/11/2019 (Exact Date)   BMI 28.12 kg/m²     Results for orders placed or performed during the hospital encounter of 04/28/20   CBC WITH AUTOMATED DIFF   Result Value Ref Range    WBC 7.5 4.6 - 13.2 K/uL    RBC 3.73 (L) 4.20 - 5.30 M/uL    HGB 10.1 (L) 12.0 - 16.0 g/dL    HCT 31.7 (L) 35.0 - 45.0 %    MCV 85.0 74.0 - 97.0 FL    MCH 27.1 24.0 - 34.0 PG    MCHC 31.9 31.0 - 37.0 g/dL    RDW 13.1 11.6 - 14.5 %    PLATELET 535 195 - 810 K/uL    MPV 11.3 9.2 - 11.8 FL    NEUTROPHILS 80 (H) 40 - 73 %    LYMPHOCYTES 13 (L) 21 - 52 %    MONOCYTES 6 3 - 10 %    EOSINOPHILS 1 0 - 5 %    BASOPHILS 0 0 - 2 %    ABS. NEUTROPHILS 6.0 1.8 - 8.0 K/UL    ABS. LYMPHOCYTES 1.0 0.9 - 3.6 K/UL    ABS. MONOCYTES 0.5 0.05 - 1.2 K/UL    ABS. EOSINOPHILS 0.0 0.0 - 0.4 K/UL    ABS.  BASOPHILS 0.0 0.0 - 0.1 K/UL    DF AUTOMATED     GLUCOSE, GESTATIONAL 1 HR TOLERANCE   Result Value Ref Range    GESTATIONAL GLUC 1HR 96 60 - 140 MG/DL

## 2020-06-17 ENCOUNTER — ROUTINE PRENATAL (OUTPATIENT)
Dept: OBGYN CLINIC | Age: 25
End: 2020-06-17

## 2020-06-17 VITALS
OXYGEN SATURATION: 99 % | HEART RATE: 109 BPM | HEIGHT: 65 IN | RESPIRATION RATE: 20 BRPM | BODY MASS INDEX: 28.46 KG/M2 | DIASTOLIC BLOOD PRESSURE: 76 MMHG | WEIGHT: 170.8 LBS | SYSTOLIC BLOOD PRESSURE: 117 MMHG

## 2020-06-17 DIAGNOSIS — Z3A.35 35 WEEKS GESTATION OF PREGNANCY: Primary | ICD-10-CM

## 2020-06-17 NOTE — PROGRESS NOTES
Birtha Stagers presents today for   Chief Complaint   Patient presents with    Routine Prenatal Visit       Is someone accompanying this pt? no  Patient does not have any concerns at this time. Patient no birth control at this time . Depression screening     Is the patient using any DME equipment during OV? no    Depression Screening:  No flowsheet data found. Learning Assessment:  No flowsheet data found. Abuse Screening:  No flowsheet data found. Fall Risk  No flowsheet data found. This Visit Test  Results for orders placed or performed during the hospital encounter of 04/28/20   CBC WITH AUTOMATED DIFF   Result Value Ref Range    WBC 7.5 4.6 - 13.2 K/uL    RBC 3.73 (L) 4.20 - 5.30 M/uL    HGB 10.1 (L) 12.0 - 16.0 g/dL    HCT 31.7 (L) 35.0 - 45.0 %    MCV 85.0 74.0 - 97.0 FL    MCH 27.1 24.0 - 34.0 PG    MCHC 31.9 31.0 - 37.0 g/dL    RDW 13.1 11.6 - 14.5 %    PLATELET 113 898 - 168 K/uL    MPV 11.3 9.2 - 11.8 FL    NEUTROPHILS 80 (H) 40 - 73 %    LYMPHOCYTES 13 (L) 21 - 52 %    MONOCYTES 6 3 - 10 %    EOSINOPHILS 1 0 - 5 %    BASOPHILS 0 0 - 2 %    ABS. NEUTROPHILS 6.0 1.8 - 8.0 K/UL    ABS. LYMPHOCYTES 1.0 0.9 - 3.6 K/UL    ABS. MONOCYTES 0.5 0.05 - 1.2 K/UL    ABS. EOSINOPHILS 0.0 0.0 - 0.4 K/UL    ABS. BASOPHILS 0.0 0.0 - 0.1 K/UL    DF AUTOMATED     GLUCOSE, GESTATIONAL 1 HR TOLERANCE   Result Value Ref Range    GESTATIONAL GLUC 1HR 96 60 - 140 MG/DL       Health Maintenance reviewed and discussed and ordered per Provider. Health Maintenance Due   Topic Date Due    HPV Age 9Y-34Y (1 - 2-dose series) 07/25/2006    OB 3RD TRIMESTER TDAP  04/17/2020   . Coordination of Care:  1. Have you been to the ER, urgent care clinic since your last visit? Hospitalized since your last visit? no    2. Have you seen or consulted any other health care providers outside of the [address] [place] [address] since your last visit? Include any pap smears or colon screening.  No  Visit Vitals  /76 Pulse (!) 109   Resp 20   Ht 5' 5\" (1.651 m)   Wt 170 lb 12.8 oz (77.5 kg)   LMP 10/11/2019 (Exact Date)   SpO2 99%   BMI 28.42 kg/m²

## 2020-06-17 NOTE — PROGRESS NOTES
Doing well, concerned with some pelvic pain- not regular in nature; +FM  Plan:  1) f/u one week routine ob- gbs/gonorrhea/chlamydia at that time

## 2020-06-17 NOTE — PATIENT INSTRUCTIONS

## 2020-06-24 ENCOUNTER — HOSPITAL ENCOUNTER (OUTPATIENT)
Dept: LAB | Age: 25
Discharge: HOME | End: 2020-06-24
Payer: OTHER GOVERNMENT

## 2020-06-24 ENCOUNTER — ROUTINE PRENATAL (OUTPATIENT)
Dept: OBGYN CLINIC | Age: 25
End: 2020-06-24

## 2020-06-24 VITALS
DIASTOLIC BLOOD PRESSURE: 75 MMHG | SYSTOLIC BLOOD PRESSURE: 130 MMHG | BODY MASS INDEX: 28.82 KG/M2 | HEART RATE: 105 BPM | RESPIRATION RATE: 18 BRPM | HEIGHT: 65 IN | TEMPERATURE: 98.3 F | WEIGHT: 173 LBS

## 2020-06-24 DIAGNOSIS — Z3A.36 36 WEEKS GESTATION OF PREGNANCY: Primary | ICD-10-CM

## 2020-06-24 DIAGNOSIS — Z3A.36 36 WEEKS GESTATION OF PREGNANCY: ICD-10-CM

## 2020-06-24 PROBLEM — O42.90 PROM (PREMATURE RUPTURE OF MEMBRANES): Status: RESOLVED | Noted: 2019-02-17 | Resolved: 2020-06-24

## 2020-06-24 PROBLEM — R82.71 GROUP B STREPTOCOCCAL BACTERIURIA: Status: RESOLVED | Noted: 2019-01-17 | Resolved: 2020-06-24

## 2020-06-24 PROBLEM — Z34.90 PREGNANCY: Status: RESOLVED | Noted: 2019-02-17 | Resolved: 2020-06-24

## 2020-06-24 LAB
BILIRUB UR QL STRIP: NEGATIVE
GLUCOSE UR-MCNC: NEGATIVE MG/DL
KETONES P FAST UR STRIP-MCNC: NEGATIVE MG/DL
PH UR STRIP: 6.5 [PH] (ref 4.6–8)
PROT UR QL STRIP: NORMAL
SP GR UR STRIP: 1.02 (ref 1–1.03)
UA UROBILINOGEN AMB POC: NORMAL (ref 0.2–1)
URINALYSIS CLARITY POC: CLEAR
URINALYSIS COLOR POC: YELLOW
URINE BLOOD POC: NEGATIVE
URINE LEUKOCYTES POC: NEGATIVE
URINE NITRITES POC: NEGATIVE

## 2020-06-24 PROCEDURE — 87081 CULTURE SCREEN ONLY: CPT

## 2020-06-24 PROCEDURE — 87147 CULTURE TYPE IMMUNOLOGIC: CPT

## 2020-06-24 PROCEDURE — 87661 TRICHOMONAS VAGINALIS AMPLIF: CPT

## 2020-06-25 LAB
BACTERIA SPEC CULT: ABNORMAL
SERVICE CMNT-IMP: ABNORMAL

## 2020-07-02 ENCOUNTER — ROUTINE PRENATAL (OUTPATIENT)
Dept: OBGYN CLINIC | Age: 25
End: 2020-07-02

## 2020-07-02 VITALS
DIASTOLIC BLOOD PRESSURE: 69 MMHG | HEART RATE: 97 BPM | SYSTOLIC BLOOD PRESSURE: 118 MMHG | BODY MASS INDEX: 29.49 KG/M2 | WEIGHT: 177 LBS | TEMPERATURE: 97.7 F | HEIGHT: 65 IN

## 2020-07-02 DIAGNOSIS — Z3A.37 37 WEEKS GESTATION OF PREGNANCY: Primary | ICD-10-CM

## 2020-07-02 NOTE — PROGRESS NOTES
Chief Complaint   Patient presents with    Routine Prenatal Visit     Visit Vitals  /69   Pulse 97   Temp 97.7 °F (36.5 °C) (Tympanic)   Ht 5' 5\" (1.651 m)   Wt 177 lb (80.3 kg)   LMP 10/11/2019 (Exact Date)   BMI 29.45 kg/m²     Results for orders placed or performed during the hospital encounter of 06/24/20   CULTURE, GENITAL GROUP B STREP   Result Value Ref Range    Special Requests: NO SPECIAL REQUESTS      Culture result: HEAVY STREPTOCOCCI, BETA HEMOLYTIC GROUP B (A)     CHLAMYDIA/NEISSERIA/TRICHOMONAS AMP   Result Value Ref Range    Chlamydia amplification Negative NEG      N. gonorrhoeae amplification Negative NEG      Trichomonas amplification Negative NEG      Specimen Source ENDOCERVICAL

## 2020-07-02 NOTE — PROGRESS NOTES
37w6d.  No Ob issues. Intermittent contractions  GBS + with PCN allergy- will obtain sensitivities. Labor precautions. RTO 1 week.

## 2020-07-02 NOTE — PATIENT INSTRUCTIONS
Group B Strep During Pregnancy: Care Instructions  Your Care Instructions     Group B strep infection is caused by a type of bacteria. It's a different kind of bacteria than the kind that causes strep throat. You may have this kind of bacteria in your body. Sometimes it may cause an infection, but most of the time it doesn't make you sick or cause symptoms. But if you pass the bacteria to your baby during the birth, it can cause serious health problems for your baby. If you have this bacteria in your body, you will get antibiotics when you are in labor. Antibiotics help prevent problems for a  baby. After birth, doctors will watch and may test your baby. If your baby tests positive for Group B strep, he or she will get antibiotics. If you plan to breastfeed your baby, don't worry. It will be safe to breastfeed. Follow-up care is a key part of your treatment and safety. Be sure to make and go to all appointments, and call your doctor if you are having problems. It's also a good idea to know your test results and keep a list of the medicines you take. How can you care for yourself at home? · If your doctor has prescribed antibiotics, take them as directed. Do not stop taking them just because you feel better. You need to take the full course of antibiotics. · Tell your doctor if you are allergic to any antibiotic. · If your water breaks, go to the hospital right away. Your doctor will give you antibiotics to help protect your baby from infection. · Tell the doctors and nurses at the hospital that you tested positive for group B strep. When should you call for help? Call your doctor now or seek immediate medical care if:  · You have symptoms of a urinary tract infection. These may include:  ? Pain or burning when you urinate. ? A frequent need to urinate without being able to pass much urine.   ? Pain in the flank, which is just below the rib cage and above the waist on either side of the back.  ? Blood in your urine. ? A fever. · You think your water has broken. · You have pain in your belly or pelvis. Watch closely for changes in your health, and be sure to contact your doctor if you have any problems. Where can you learn more? Go to http://jackie-mahesh.info/  Enter M001 in the search box to learn more about \"Group B Strep During Pregnancy: Care Instructions. \"  Current as of: February 11, 2020               Content Version: 12.5  © 0975-0861 [employer]. Care instructions adapted under license by [employer] (which disclaims liability or warranty for this information). If you have questions about a medical condition or this instruction, always ask your healthcare professional. [address] any warranty or liability for your use of this information.

## 2020-07-09 ENCOUNTER — ROUTINE PRENATAL (OUTPATIENT)
Dept: OBGYN CLINIC | Age: 25
End: 2020-07-09

## 2020-07-09 VITALS
HEART RATE: 125 BPM | SYSTOLIC BLOOD PRESSURE: 146 MMHG | WEIGHT: 180 LBS | DIASTOLIC BLOOD PRESSURE: 87 MMHG | HEIGHT: 65 IN | BODY MASS INDEX: 29.99 KG/M2

## 2020-07-09 DIAGNOSIS — Z3A.38 PREGNANCY WITH 38 COMPLETED WEEKS GESTATION: Primary | ICD-10-CM

## 2020-07-09 NOTE — PROGRESS NOTES
Chief Complaint   Patient presents with    Routine Prenatal Visit     Visit Vitals  /87   Pulse (!) 125   Ht 5' 5\" (1.651 m)   Wt 180 lb (81.6 kg)   LMP 10/11/2019 (Exact Date)   BMI 29.95 kg/m²     Results for orders placed or performed during the hospital encounter of 06/24/20   CULTURE, GENITAL GROUP B STREP    Specimen: VAGINAL/RECTAL   Result Value Ref Range    Special Requests: NO SPECIAL REQUESTS      Culture result: HEAVY STREPTOCOCCI, BETA HEMOLYTIC GROUP B (A)     CHLAMYDIA/NEISSERIA/TRICHOMONAS AMP   Result Value Ref Range    Chlamydia amplification Negative NEG      N. gonorrhoeae amplification Negative NEG      Trichomonas amplification Negative NEG      Specimen Source ENDOCERVICAL     .

## 2020-07-09 NOTE — PROGRESS NOTES
38w6d. No OB issues. Notes intermittent contractions. Pt desires stripping of membranes. SVE: 2/60/-3  Ultrasound  Next visit. Labor precautions. RTO 1 week.

## 2020-08-27 ENCOUNTER — ROUTINE PRENATAL (OUTPATIENT)
Dept: OBGYN CLINIC | Age: 25
End: 2020-08-27

## 2020-08-27 VITALS
WEIGHT: 159.8 LBS | SYSTOLIC BLOOD PRESSURE: 117 MMHG | TEMPERATURE: 97.8 F | RESPIRATION RATE: 20 BRPM | OXYGEN SATURATION: 99 % | HEART RATE: 68 BPM | DIASTOLIC BLOOD PRESSURE: 77 MMHG | BODY MASS INDEX: 26.62 KG/M2 | HEIGHT: 65 IN

## 2020-08-27 NOTE — PROGRESS NOTES
Postpartum visit  [place] depression scale negative  Pt. Has stopped bleeding.   F/U as needed

## 2020-08-27 NOTE — PROGRESS NOTES
1. Have you been to the ER, urgent care clinic since your last visit? Hospitalized since your last visit? No    2. Have you seen or consulted any other health care providers outside of the [address] [place] [address] since your last visit? Include any pap smears or colon screening.  No   Visit Vitals  /77   Pulse 68   Temp 97.8 °F (36.6 °C) (Oral)   Resp 20   Ht 5' 5\" (1.651 m)   Wt 159 lb 12.8 oz (72.5 kg)   LMP 10/11/2019 (Exact Date)   SpO2 99%   BMI 26.59 kg/m²      Depression Scale  In the past 7 days:  I have been able to laugh and see the funny side of things[de-identified] As much as I always could  I have looked forward with enjoyment to things: As much as I ever did  I have blamed myself unnecessarily when things went wrong: No, never  I have been anxious or worried for no good reason: No, not at all  I have felt scared or panicky for no good reason: No, not at all  Things have been getting on top of me: No, I have been coping as well as ever  I have been so unhappy that I have had difficulty sleeping: No, not at all  I have felt sad or miserable: No, not at all  I have been so unhappy that I have been crying: No, never  The thought of harming myself has occured to me: Never  [place]  Depression Scale (EPDS)  [facility] [place]  Depression Score: 0  Possible Depression: 10 or greater  Always look at item 10 (suicidal thoughts): 0

## 2022-04-03 PROBLEM — Z34.90 PREGNANCY: Status: ACTIVE | Noted: 2022-04-03

## 2023-01-31 RX ORDER — IBUPROFEN 800 MG/1
800 TABLET ORAL EVERY 8 HOURS PRN
COMMUNITY
Start: 2019-02-20

## 2023-01-31 RX ORDER — FERROUS SULFATE 325(65) MG
325 TABLET ORAL DAILY
COMMUNITY
Start: 2020-05-12